# Patient Record
Sex: FEMALE | URBAN - METROPOLITAN AREA
[De-identification: names, ages, dates, MRNs, and addresses within clinical notes are randomized per-mention and may not be internally consistent; named-entity substitution may affect disease eponyms.]

---

## 2020-05-27 ENCOUNTER — APPOINTMENT (RX ONLY)
Dept: URBAN - METROPOLITAN AREA CLINIC 115 | Facility: CLINIC | Age: 40
Setting detail: DERMATOLOGY
End: 2020-05-27

## 2020-05-27 DIAGNOSIS — Z41.9 ENCOUNTER FOR PROCEDURE FOR PURPOSES OTHER THAN REMEDYING HEALTH STATE, UNSPECIFIED: ICD-10-CM

## 2020-05-27 PROCEDURE — ? RESTYLANE LYFT INJECTION

## 2020-05-27 NOTE — PROCEDURE: RESTYLANE LYFT INJECTION
Consent: Written consent obtained. Risks include but not limited to bruising, beading, irregular texture, ulceration, infection, allergic reaction, scar formation, incomplete augmentation, temporary nature, procedural pain.
Lot #: [id number]
Brows Filler  Volume In Cc: 0
Include Cannula Size?: 25G
Cheeks Filler Volume In Cc: 1
Procedural Text: The filler was administered to the treatment areas noted above.
Filler: Sindy Reddy
Include Cannula Information In Note?: Yes
Jawline Filler Volume In Cc: 0.5
Use Map Statement For Sites (Optional): No
Post-Care Instructions: Patient instructed to apply ice to reduce swelling.
Anesthesia Type: 1% lidocaine with epinephrine
Expiration Date (Month Year): 05/31/2022
Include Cannula Length?: 1.5 inch
Additional Anesthesia Volume In Cc: 6
Detail Level: Detailed
Number Of Syringes (Required For Inventory): 2
Map Statement: See Attach Map for Complete Details
Include Cannula Brand?: DermaSculpt

## 2021-08-05 ENCOUNTER — APPOINTMENT (RX ONLY)
Dept: URBAN - METROPOLITAN AREA CLINIC 115 | Facility: CLINIC | Age: 41
Setting detail: DERMATOLOGY
End: 2021-08-05

## 2021-08-05 DIAGNOSIS — Z41.9 ENCOUNTER FOR PROCEDURE FOR PURPOSES OTHER THAN REMEDYING HEALTH STATE, UNSPECIFIED: ICD-10-CM

## 2021-08-05 PROCEDURE — ? SCULPTRA

## 2021-08-05 NOTE — PROCEDURE: SCULPTRA
Show Right And Left Pa Volume?: No
Right Tear Trough Volume In Cc: 0
Show Chin Volume?: Yes
Dilution Method: The Sculptra was diluted with 5 ml of sterile water for a total volume of 9ccs for each vial.
Injection Technique: The Sculptra was injected to the listed areas after cleansing the skin and providing appropriate anesthesia.
Additional Anesthesia Volume In Cc: 6
Map Statement: See Attached Map for Complete Details.
Consent: Written consent obtained. Risks include but not limited to bruising, beading, irregular texture, ulceration, infection, allergic reaction, scar formation, incomplete augmentation, temporary nature, procedural pain.
Treatment Number: 1
Post-Care Instructions: Patient instructed to apply ice to reduce swelling. Massage treated areas 5 times daily for a total of 5 minutes each time. Follow up for next treatment if applicable in 1 month. Follow up for post-treatment photos in 3 months. Follow up for yearly vial after series complete.
Anesthesia Type: 1% lidocaine with epinephrine
Detail Level: Detailed
Anesthesia Volume In Cc: 0.5
Lot #: [id number]

## 2021-08-05 NOTE — HPI: COSMETIC (FILLERS)
Have You Had Fillers Before?: has had fillers
Additional History: Patient does not have any specific areas of concern. She would like to boost her own collagen and enhance the skin quality